# Patient Record
Sex: FEMALE | Race: WHITE | NOT HISPANIC OR LATINO | Employment: FULL TIME | URBAN - METROPOLITAN AREA
[De-identification: names, ages, dates, MRNs, and addresses within clinical notes are randomized per-mention and may not be internally consistent; named-entity substitution may affect disease eponyms.]

---

## 2021-11-10 PROCEDURE — U0004 COV-19 TEST NON-CDC HGH THRU: HCPCS | Performed by: FAMILY MEDICINE

## 2024-04-09 ENCOUNTER — LAB (OUTPATIENT)
Dept: LAB | Facility: HOSPITAL | Age: 34
End: 2024-04-09
Payer: COMMERCIAL

## 2024-04-09 DIAGNOSIS — K52.9 GASTROENTERITIS: ICD-10-CM

## 2024-04-09 LAB

## 2024-04-09 PROCEDURE — 87324 CLOSTRIDIUM AG IA: CPT

## 2024-04-09 PROCEDURE — 87449 NOS EACH ORGANISM AG IA: CPT

## 2024-04-09 PROCEDURE — 87507 IADNA-DNA/RNA PROBE TQ 12-25: CPT

## 2024-04-10 LAB
C DIFF GDH + TOXINS A+B STL QL IA.RAPID: NEGATIVE
C DIFF GDH + TOXINS A+B STL QL IA.RAPID: NEGATIVE

## 2024-04-16 ENCOUNTER — OFFICE VISIT (OUTPATIENT)
Dept: FAMILY MEDICINE CLINIC | Facility: CLINIC | Age: 34
End: 2024-04-16
Payer: COMMERCIAL

## 2024-04-16 VITALS
HEART RATE: 87 BPM | BODY MASS INDEX: 25.08 KG/M2 | DIASTOLIC BLOOD PRESSURE: 74 MMHG | TEMPERATURE: 98 F | SYSTOLIC BLOOD PRESSURE: 110 MMHG | WEIGHT: 159.8 LBS | HEIGHT: 67 IN | OXYGEN SATURATION: 99 %

## 2024-04-16 DIAGNOSIS — R10.84 ABDOMINAL CRAMPING, GENERALIZED: ICD-10-CM

## 2024-04-16 DIAGNOSIS — R10.10 UPPER ABDOMINAL PAIN: Primary | ICD-10-CM

## 2024-04-16 DIAGNOSIS — R19.7 DIARRHEA, UNSPECIFIED TYPE: ICD-10-CM

## 2024-04-16 DIAGNOSIS — R11.0 NAUSEA: ICD-10-CM

## 2024-04-16 PROBLEM — D50.0 IRON DEFICIENCY ANEMIA DUE TO CHRONIC BLOOD LOSS: Status: ACTIVE | Noted: 2023-03-10

## 2024-04-16 PROBLEM — D69.3 CHRONIC ITP (IDIOPATHIC THROMBOCYTOPENIA): Status: ACTIVE | Noted: 2024-04-16

## 2024-04-16 PROBLEM — Z86.2 HISTORY OF ITP: Status: ACTIVE | Noted: 2023-03-13

## 2024-04-16 PROBLEM — D80.3 IGG DEFICIENCY: Status: ACTIVE | Noted: 2023-03-22

## 2024-04-16 PROCEDURE — 99213 OFFICE O/P EST LOW 20 MIN: CPT | Performed by: NURSE PRACTITIONER

## 2024-04-16 RX ORDER — DICYCLOMINE HYDROCHLORIDE 10 MG/1
10 CAPSULE ORAL
Qty: 90 CAPSULE | Refills: 2 | Status: SHIPPED | OUTPATIENT
Start: 2024-04-16

## 2024-04-16 RX ORDER — DICYCLOMINE HYDROCHLORIDE 10 MG/1
CAPSULE ORAL
Qty: 368 CAPSULE | OUTPATIENT
Start: 2024-04-16

## 2024-04-16 NOTE — PROGRESS NOTES
"Subjective   Asha Lee is a 33 y.o. female presents for   Chief Complaint   Patient presents with    GI Problem       Health Maintenance Due   Topic Date Due    BMI FOLLOWUP  Never done    ANNUAL PHYSICAL  Never done    PAP SMEAR  Never done    COVID-19 Vaccine (3 - 2023-24 season) 09/01/2023       History of Present Illness   Patient present to establish care.  She reports a Hx of ITP and states that her platelet count was really low yesterday.  She is returning to hematology to have it rechecked today.  She also reports that she has been experiencing GI symptoms that started on Easter with nausea, cramping and diarrhea after eating.  She states since that time, she cannot eat anything without getting nauseated and having diarrhea and has been waiting all day to eat due to symptoms.  She reports pain across her upper abdomen after eating anything, but states it does not typically occur with drinking.    She reports a 5 lb weight loss in the past month.  She was prescribed zofran at urgent care but her insurance hasn't approved it yet.  She states she also applies a heating pad and it will improve symptoms as well.  She reports a history of IBS in high school and was able to improve symptoms with dietary changes.  She currently takes a probiotic daily.  Discussed with patient multiple causes of pain, but will prescribe Bentyl today due to history of IBS.  Also discussed symptoms of gallbladder disease and will complete a gallbladder ultrasound for further evaluation.  Will also refer patient to GI for ongoing management unless a clear cause is found in this workup.  Vitals:    04/16/24 1032   BP: 110/74   Pulse: 87   Temp: 98 °F (36.7 °C)   TempSrc: Tympanic   SpO2: 99%   Weight: 72.5 kg (159 lb 12.8 oz)   Height: 170 cm (66.93\")     Body mass index is 25.08 kg/m².    Current Outpatient Medications on File Prior to Visit   Medication Sig Dispense Refill    IRON-VITAMIN C PO Take  by mouth. (Patient not taking: " Reported on 4/16/2024)      ondansetron ODT (ZOFRAN-ODT) 4 MG disintegrating tablet Place 1 tablet on the tongue Every 8 (Eight) Hours As Needed for Nausea. (Patient not taking: Reported on 4/16/2024) 20 tablet 0     No current facility-administered medications on file prior to visit.       The following portions of the patient's history were reviewed and updated as appropriate: allergies, current medications, past family history, past medical history, past social history, past surgical history, and problem list.    Review of Systems   Gastrointestinal:  Positive for abdominal pain.       Objective   Physical Exam  Vitals and nursing note reviewed.   Constitutional:       Appearance: Normal appearance. She is well-developed.   HENT:      Head: Normocephalic and atraumatic.      Right Ear: External ear normal.      Left Ear: External ear normal.      Nose: Nose normal.   Eyes:      Extraocular Movements: Extraocular movements intact.      Conjunctiva/sclera: Conjunctivae normal.      Pupils: Pupils are equal, round, and reactive to light.   Cardiovascular:      Rate and Rhythm: Normal rate and regular rhythm.      Heart sounds: Normal heart sounds.   Pulmonary:      Effort: Pulmonary effort is normal.      Breath sounds: Normal breath sounds.   Abdominal:      General: Bowel sounds are normal. There is no distension.      Palpations: Abdomen is soft.      Tenderness: There is abdominal tenderness (Upper abdomen). There is no guarding or rebound.      Hernia: No hernia is present.   Genitourinary:     Vagina: Normal.   Musculoskeletal:         General: Normal range of motion.      Cervical back: Normal range of motion and neck supple.   Skin:     General: Skin is warm and dry.   Neurological:      General: No focal deficit present.      Mental Status: She is alert and oriented to person, place, and time.   Psychiatric:         Mood and Affect: Mood normal.         Behavior: Behavior normal.         Judgment: Judgment  normal.       PHQ-9 Total Score:      Assessment & Plan   Diagnoses and all orders for this visit:    1. Upper abdominal pain (Primary)  -     Ambulatory Referral to Gastroenterology  -     US Gallbladder; Future    2. Nausea  -     Ambulatory Referral to Gastroenterology  -     US Gallbladder; Future    3. Diarrhea, unspecified type  -     Ambulatory Referral to Gastroenterology  -     dicyclomine (BENTYL) 10 MG capsule; Take 1 capsule by mouth 4 (Four) Times a Day Before Meals & at Bedtime As Needed for Abdominal Cramping.  Dispense: 90 capsule; Refill: 2    4. Abdominal cramping, generalized  -     Ambulatory Referral to Gastroenterology  -     dicyclomine (BENTYL) 10 MG capsule; Take 1 capsule by mouth 4 (Four) Times a Day Before Meals & at Bedtime As Needed for Abdominal Cramping.  Dispense: 90 capsule; Refill: 2        There are no Patient Instructions on file for this visit.

## 2024-04-16 NOTE — TELEPHONE ENCOUNTER
Rx Refill Note  Requested Prescriptions     Pending Prescriptions Disp Refills   • dicyclomine (BENTYL) 10 MG capsule [Pharmacy Med Name: DICYCLOMINE 10MG CAPSULES] 368 capsule      Sig: TAKE 1 CAPSULE BY MOUTH FOUR TIMES DAILY BEFORE MEALS AND AT BEDTIME AS NEEDED FOR ABDOMINAL CRAMPS      Last office visit with prescribing clinician: 4/16/2024      Next office visit with prescribing clinician: 10/16/2024   3}  Melva Brian  04/16/24, 12:23 EDT

## 2024-04-29 DIAGNOSIS — R10.10 UPPER ABDOMINAL PAIN: Primary | ICD-10-CM

## 2024-04-29 DIAGNOSIS — R11.0 NAUSEA: ICD-10-CM

## 2024-04-29 DIAGNOSIS — R10.84 ABDOMINAL CRAMPING, GENERALIZED: ICD-10-CM

## 2024-04-29 DIAGNOSIS — R19.7 DIARRHEA, UNSPECIFIED TYPE: ICD-10-CM

## 2024-04-29 DIAGNOSIS — R10.10 UPPER ABDOMINAL PAIN: ICD-10-CM

## 2024-04-29 RX ORDER — OMEPRAZOLE 40 MG/1
40 CAPSULE, DELAYED RELEASE ORAL DAILY
Qty: 90 CAPSULE | Refills: 0 | Status: SHIPPED | OUTPATIENT
Start: 2024-04-29

## 2024-04-29 RX ORDER — OMEPRAZOLE 40 MG/1
40 CAPSULE, DELAYED RELEASE ORAL DAILY
Qty: 30 CAPSULE | Refills: 6 | Status: SHIPPED | OUTPATIENT
Start: 2024-04-29 | End: 2024-04-29

## 2024-05-02 ENCOUNTER — PATIENT ROUNDING (BHMG ONLY) (OUTPATIENT)
Dept: FAMILY MEDICINE CLINIC | Facility: CLINIC | Age: 34
End: 2024-05-02
Payer: COMMERCIAL

## 2024-05-02 NOTE — PROGRESS NOTES
A [employer] message has been sent to the patient for patient rounding with [facility]

## 2024-06-17 ENCOUNTER — OFFICE (OUTPATIENT)
Dept: URBAN - METROPOLITAN AREA CLINIC 64 | Facility: CLINIC | Age: 34
End: 2024-06-17
Payer: COMMERCIAL

## 2024-06-17 VITALS
HEIGHT: 67 IN | HEART RATE: 75 BPM | WEIGHT: 162 LBS | SYSTOLIC BLOOD PRESSURE: 132 MMHG | DIASTOLIC BLOOD PRESSURE: 82 MMHG

## 2024-06-17 DIAGNOSIS — R11.0 NAUSEA: ICD-10-CM

## 2024-06-17 DIAGNOSIS — D69.3 IMMUNE THROMBOCYTOPENIC PURPURA: ICD-10-CM

## 2024-06-17 DIAGNOSIS — D50.9 IRON DEFICIENCY ANEMIA, UNSPECIFIED: ICD-10-CM

## 2024-06-17 DIAGNOSIS — R10.13 EPIGASTRIC PAIN: ICD-10-CM

## 2024-06-17 DIAGNOSIS — R19.8 OTHER SPECIFIED SYMPTOMS AND SIGNS INVOLVING THE DIGESTIVE S: ICD-10-CM

## 2024-06-17 DIAGNOSIS — K58.1 IRRITABLE BOWEL SYNDROME WITH CONSTIPATION: ICD-10-CM

## 2024-06-17 PROCEDURE — 99204 OFFICE O/P NEW MOD 45 MIN: CPT | Performed by: NURSE PRACTITIONER

## 2024-10-16 ENCOUNTER — OFFICE VISIT (OUTPATIENT)
Dept: FAMILY MEDICINE CLINIC | Facility: CLINIC | Age: 34
End: 2024-10-16
Payer: COMMERCIAL

## 2024-10-16 VITALS
HEIGHT: 67 IN | DIASTOLIC BLOOD PRESSURE: 62 MMHG | SYSTOLIC BLOOD PRESSURE: 103 MMHG | BODY MASS INDEX: 25.05 KG/M2 | TEMPERATURE: 97.7 F | WEIGHT: 159.6 LBS | HEART RATE: 59 BPM | OXYGEN SATURATION: 98 %

## 2024-10-16 DIAGNOSIS — Z00.00 ROUTINE GENERAL MEDICAL EXAMINATION AT A HEALTH CARE FACILITY: Primary | ICD-10-CM

## 2024-10-16 DIAGNOSIS — D69.3 CHRONIC ITP (IDIOPATHIC THROMBOCYTOPENIA): ICD-10-CM

## 2024-10-16 PROCEDURE — 99395 PREV VISIT EST AGE 18-39: CPT | Performed by: NURSE PRACTITIONER

## 2024-10-16 NOTE — PROGRESS NOTES
"Subjective   Asha Lee is a 33 y.o. female presents for   Chief Complaint   Patient presents with    Annual Exam     Patient does not currently have a GYN needing a referral  Does not want a FLU/COVID shot  No additional concerns.       Health Maintenance Due   Topic Date Due    ANNUAL PHYSICAL  Never done    PAP SMEAR  Never done       History of Present Illness  The patient is a 33-year-old female who presents for an annual exam.    She reports no current health concerns or issues. Her last visit was due to stomach problems, which she believes were caused by a severe virus that persisted for two weeks. She has not experienced any recurrence of these symptoms since then. A HIDA scan and ultrasound were performed, both of which showed normal results.    She has a history of low platelet count, for which she takes Promacta.  She has been having monthly lab tests due to her Promacta treatment. Initially, she was taking the medication daily, but after a couple of weeks, Dr. Nguyễn advised her to take it every other day.    She maintains a healthy diet and exercise routine, although she has been unable to visit the gym as frequently as she did during the summer due to her work schedule. She exercises two to three times a week and is mindful of her diet, avoiding certain foods. She reports no issues with urination or bowel movements, and is not experiencing any pain or swelling.    She declined the flu and COVID-19 vaccines during this visit.    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    Vitals:    10/16/24 1536   BP: 103/62   BP Location: Right arm   Patient Position: Sitting   Cuff Size: Adult   Pulse: 59   Temp: 97.7 °F (36.5 °C)   TempSrc: Temporal   SpO2: 98%   Weight: 72.4 kg (159 lb 9.6 oz)   Height: 170 cm (66.93\")     Body mass index is 25.05 kg/m².    Current Outpatient Medications on File Prior to Visit   Medication Sig Dispense Refill    eltrombopag " (PROMACTA) 50 MG tablet Take 1 tablet by mouth.      [DISCONTINUED] omeprazole (priLOSEC) 40 MG capsule TAKE 1 CAPSULE BY MOUTH DAILY 90 capsule 0    [DISCONTINUED] dicyclomine (BENTYL) 10 MG capsule Take 1 capsule by mouth 4 (Four) Times a Day Before Meals & at Bedtime As Needed for Abdominal Cramping. (Patient not taking: Reported on 10/16/2024) 90 capsule 2    [DISCONTINUED] ondansetron ODT (ZOFRAN-ODT) 4 MG disintegrating tablet Place 1 tablet on the tongue Every 8 (Eight) Hours As Needed for Nausea. (Patient not taking: Reported on 4/16/2024) 20 tablet 0     No current facility-administered medications on file prior to visit.       The following portions of the patient's history were reviewed and updated as appropriate: allergies, current medications, past family history, past medical history, past social history, past surgical history, and problem list.    Review of Systems    Objective   Physical Exam  Vitals and nursing note reviewed.   Constitutional:       Appearance: Normal appearance. She is well-developed.   HENT:      Head: Normocephalic and atraumatic.      Right Ear: Tympanic membrane, ear canal and external ear normal.      Left Ear: Tympanic membrane, ear canal and external ear normal.      Nose: Nose normal.   Eyes:      Extraocular Movements: Extraocular movements intact.      Conjunctiva/sclera: Conjunctivae normal.      Pupils: Pupils are equal, round, and reactive to light.   Cardiovascular:      Rate and Rhythm: Normal rate and regular rhythm.      Pulses: Normal pulses.      Heart sounds: Normal heart sounds.   Pulmonary:      Effort: Pulmonary effort is normal.      Breath sounds: Normal breath sounds.   Abdominal:      General: Bowel sounds are normal.      Palpations: Abdomen is soft.   Genitourinary:     Vagina: Normal.   Musculoskeletal:         General: Normal range of motion.      Cervical back: Normal range of motion and neck supple.   Skin:     General: Skin is warm and dry.    Neurological:      General: No focal deficit present.      Mental Status: She is alert and oriented to person, place, and time.   Psychiatric:         Mood and Affect: Mood normal.         Behavior: Behavior normal.         Judgment: Judgment normal.              PHQ-9 Total Score:      Results  Imaging  HIDA scan and ultrasound of gallbladder were normal.    Assessment & Plan   Diagnoses and all orders for this visit:    1. Routine general medical examination at a health care facility (Primary)  Comments:  Patient counseled on importance of balanced diet and routine exercise as well as risk and benefits of current recommended vaccines.  Orders:  -     Lipid Panel; Future  -     TSH; Future    2. Chronic ITP (idiopathic thrombocytopenia)         1. Annual exam.  She is not yet due for mammogram or colon cancer screening unless there is a significant family history of these conditions. Orders for thyroid and cholesterol tests will be provided. She is advised to continue with annual check-ups unless specific concerns arise.    2. Thrombocytopenia.  She has a history of low platelets and is currently on Promacta. She reports monthly lab work to monitor her platelet levels. She will continue with monthly CBC tests at [facility] to track her platelet levels.      There are no Patient Instructions on file for this visit.         Patient or patient representative verbalized consent for the use of Ambient Listening during the visit with  SUMIT Schrader for chart documentation. 10/16/2024  15:50 EDT

## 2025-01-08 ENCOUNTER — OFFICE VISIT (OUTPATIENT)
Dept: FAMILY MEDICINE CLINIC | Facility: CLINIC | Age: 35
End: 2025-01-08
Payer: COMMERCIAL

## 2025-01-08 VITALS
HEIGHT: 67 IN | TEMPERATURE: 99.3 F | WEIGHT: 160.8 LBS | SYSTOLIC BLOOD PRESSURE: 116 MMHG | HEART RATE: 91 BPM | BODY MASS INDEX: 25.24 KG/M2 | OXYGEN SATURATION: 96 % | DIASTOLIC BLOOD PRESSURE: 72 MMHG

## 2025-01-08 DIAGNOSIS — K52.9 COLITIS: Primary | ICD-10-CM

## 2025-01-08 PROCEDURE — 99213 OFFICE O/P EST LOW 20 MIN: CPT | Performed by: NURSE PRACTITIONER

## 2025-01-08 RX ORDER — ONDANSETRON 4 MG/1
4 TABLET, ORALLY DISINTEGRATING ORAL EVERY 8 HOURS PRN
COMMUNITY
Start: 2025-01-07 | End: 2025-01-12

## 2025-01-08 RX ORDER — CIPROFLOXACIN HYDROCHLORIDE 500 MG/1
500 TABLET, FILM COATED ORAL 2 TIMES DAILY
COMMUNITY
Start: 2025-01-07 | End: 2025-01-17

## 2025-01-08 RX ORDER — METRONIDAZOLE 500 MG/1
500 TABLET ORAL 2 TIMES DAILY
COMMUNITY
Start: 2025-01-07 | End: 2025-01-17

## 2025-01-08 RX ORDER — OMEPRAZOLE 40 MG/1
40 CAPSULE, DELAYED RELEASE ORAL DAILY
Qty: 90 CAPSULE | Refills: 1 | Status: SHIPPED | OUTPATIENT
Start: 2025-01-08

## 2025-01-08 RX ORDER — OXYCODONE HYDROCHLORIDE AND ACETAMINOPHEN 5; 325 MG/1; MG/1
1 TABLET ORAL EVERY 4 HOURS PRN
COMMUNITY
Start: 2025-01-07 | End: 2025-01-12

## 2025-01-08 RX ORDER — OMEPRAZOLE 40 MG/1
1 CAPSULE, DELAYED RELEASE ORAL DAILY
COMMUNITY
Start: 2024-12-03 | End: 2025-01-08 | Stop reason: SDUPTHER

## 2025-01-08 NOTE — PROGRESS NOTES
Subjective   Asha Lee is a 34 y.o. female presents for   Chief Complaint   Patient presents with    Hospital Follow Up Visit     Dx: Colitis. Patient has some questions and will need refills on meds       Health Maintenance Due   Topic Date Due    PAP SMEAR  Never done       History of Present Illness  The patient is a 34-year-old female who presents for follow up of colitis.    She sought emergency care yesterday due to an episode of colitis, characterized by persistent bloody diarrhea. She was prescribed antibiotics, which she started taking around 4:30 PM yesterday. She reports a decrease in pain intensity, describing it as throbbing and manageable compared to the previous day. She has a history of gastrointestinal issues dating back to high school, which have led her to adopt a cautious approach to her diet. She has not consumed any food since last Thursday, when she began experiencing intermittent pain. The pain intensified the night before her ER visit, disrupting her sleep. She avoids soft drinks and certain foods that exacerbate her symptoms, such as raw broccoli. She has previously consulted with a gastroenterologist for her gut issues and underwent a HIDA scan, which yielded normal results. She was prescribed dicyclomine, which alleviated her symptoms. She has never undergone a colonoscopy and does not experience constipation. She is uncertain about the need for medication refills, as she was advised in the ER to continue her current regimen until completion. She has sufficient Zofran at home, having only taken one dose last night, and none today. She was also prescribed Percocet for pain management, of which she took one dose last night, another during the night, and a third this morning. She has not required additional doses since then.    Supplemental Information  Her platelet count was recorded as 84 during her ER visit. She is currently on a regimen of Promacta, administered every other  "day.    MEDICATIONS  Current: Promacta, Cipro, Flagyl, omeprazole, Zofran, Percocet    Vitals:    01/08/25 1401   BP: 116/72   BP Location: Right arm   Patient Position: Sitting   Cuff Size: Large Adult   Pulse: 91   Temp: 99.3 °F (37.4 °C)   TempSrc: Tympanic   SpO2: 96%   Weight: 72.9 kg (160 lb 12.8 oz)   Height: 170 cm (66.93\")     Body mass index is 25.24 kg/m².    Current Outpatient Medications on File Prior to Visit   Medication Sig Dispense Refill    Cipro 500 MG tablet Take 1 tablet by mouth 2 (Two) Times a Day.      eltrombopag (PROMACTA) 50 MG tablet Take 1 tablet by mouth.      metroNIDAZOLE (FLAGYL) 500 MG tablet Take 1 tablet by mouth 2 (Two) Times a Day.      ondansetron ODT (ZOFRAN-ODT) 4 MG disintegrating tablet Place 1 tablet on the tongue Every 8 (Eight) Hours As Needed for Nausea.      Percocet 5-325 MG per tablet Take 1 tablet by mouth Every 4 (Four) Hours As Needed for Moderate Pain.      [DISCONTINUED] omeprazole (priLOSEC) 40 MG capsule Take 1 capsule by mouth Daily.       No current facility-administered medications on file prior to visit.       The following portions of the patient's history were reviewed and updated as appropriate: allergies, current medications, past family history, past medical history, past social history, past surgical history, and problem list.    Review of Systems   Gastrointestinal:  Positive for abdominal pain, blood in stool and diarrhea.       Objective   Physical Exam  Vitals and nursing note reviewed.   Constitutional:       Appearance: Normal appearance. She is well-developed.   HENT:      Head: Normocephalic and atraumatic.      Right Ear: External ear normal.      Left Ear: External ear normal.      Nose: Nose normal.   Eyes:      Extraocular Movements: Extraocular movements intact.      Pupils: Pupils are equal, round, and reactive to light.   Cardiovascular:      Rate and Rhythm: Normal rate and regular rhythm.      Heart sounds: Normal heart sounds. "   Pulmonary:      Effort: Pulmonary effort is normal.      Breath sounds: Normal breath sounds.   Abdominal:      General: Bowel sounds are normal.      Palpations: Abdomen is soft.      Tenderness: There is abdominal tenderness (RLQ).   Genitourinary:     Vagina: Normal.   Musculoskeletal:         General: Normal range of motion.      Cervical back: Normal range of motion and neck supple.   Skin:     General: Skin is warm and dry.   Neurological:      General: No focal deficit present.      Mental Status: She is alert and oriented to person, place, and time.   Psychiatric:         Mood and Affect: Mood normal.         Behavior: Behavior normal.         Judgment: Judgment normal.          Abdominal exam was performed.    PHQ-9 Total Score:      Results  Laboratory Studies  Platelets were 84.    Assessment & Plan   There are no diagnoses linked to this encounter.     1. Colitis.  She was recently treated in the ER for colitis and prescribed Cipro and Flagyl. She reports ongoing bloody diarrhea and abdominal pain, which has slightly improved but persists. She is still early in her course of antibiotics, probably not going to notice significant difference until tomorrow or the next day and by Friday she will start feeling quite a bit better. She is advised to adhere to a clear liquid diet initially, gradually transitioning to bland foods such as broth-type soups and saltine crackers. Given her history, she may be more susceptible to inflammation and bleeding. She is encouraged to follow up with a gastroenterologist, particularly if the condition becomes recurrent. The potential risks associated with untreated colitis were discussed. She is reassured that the antibiotics are a temporary measure to reduce inflammation and infection, and retreatment will be considered if symptoms recur.  She is instructed to complete the antibiotic course as prescribed and to inform us if her condition worsens or fails to improve upon  completion of the antibiotics. A prescription for omeprazole 20 mg has been provided.    Follow-up  The patient will follow up as needed.    There are no Patient Instructions on file for this visit.         Patient or patient representative verbalized consent for the use of Ambient Listening during the visit with  SUMIT Schrader for chart documentation. 1/8/2025  14:39 EST

## 2025-01-14 ENCOUNTER — OFFICE (OUTPATIENT)
Dept: URBAN - METROPOLITAN AREA CLINIC 64 | Facility: CLINIC | Age: 35
End: 2025-01-14
Payer: COMMERCIAL

## 2025-01-14 VITALS
SYSTOLIC BLOOD PRESSURE: 105 MMHG | HEIGHT: 67 IN | WEIGHT: 160 LBS | HEART RATE: 67 BPM | DIASTOLIC BLOOD PRESSURE: 71 MMHG

## 2025-01-14 DIAGNOSIS — R11.0 NAUSEA: ICD-10-CM

## 2025-01-14 DIAGNOSIS — R19.8 OTHER SPECIFIED SYMPTOMS AND SIGNS INVOLVING THE DIGESTIVE S: ICD-10-CM

## 2025-01-14 DIAGNOSIS — R10.84 GENERALIZED ABDOMINAL PAIN: ICD-10-CM

## 2025-01-14 DIAGNOSIS — K52.9 NONINFECTIVE GASTROENTERITIS AND COLITIS, UNSPECIFIED: ICD-10-CM

## 2025-01-14 DIAGNOSIS — D50.9 IRON DEFICIENCY ANEMIA, UNSPECIFIED: ICD-10-CM

## 2025-01-14 PROCEDURE — 99213 OFFICE O/P EST LOW 20 MIN: CPT | Performed by: NURSE PRACTITIONER

## 2025-02-07 ENCOUNTER — TELEPHONE (OUTPATIENT)
Dept: FAMILY MEDICINE CLINIC | Facility: CLINIC | Age: 35
End: 2025-02-07
Payer: COMMERCIAL

## 2025-02-07 RX ORDER — METRONIDAZOLE 500 MG/1
500 TABLET ORAL 2 TIMES DAILY
Qty: 20 TABLET | Refills: 0 | Status: SHIPPED | OUTPATIENT
Start: 2025-02-07 | End: 2025-02-17

## 2025-02-07 NOTE — TELEPHONE ENCOUNTER
Asha Lee notified and voiced comprehension and understanding.    She stated that she is having pain and cramping in her left lower abdomin. She is not needing pain medicine just something to help her until her colonoscopy on 2/19/25.

## 2025-02-07 NOTE — TELEPHONE ENCOUNTER
Caller: Asha Lee    Relationship to patient: Self    Best call back number:   Telephone Information:   Mobile [phone]         Patient is needing: PATIENT IS CALLING TO REQUEST MEDICATION THAT SHE RECEIVED AT [facility] ER BE CALLED IN FOR HER. SHE SAID AT HER FOLLOW UP VISIT WITH PCP SHE TOLD HER IF SHE HAD COMPLICATIONS AGAIN TO CALL OFFICE AND SHE WOULD CALL IN. PATIENT IS AT WORK AND CANNOT THINK OF THE NAME OF MEDICATION. PLEASE CALL PATIENT BACK.

## 2025-02-07 NOTE — TELEPHONE ENCOUNTER
Review of the last note shows that she was recently treated for colitis.  Ask what symptoms she is experiencing as it looks like a few different medications were sent in for her.

## 2025-02-19 ENCOUNTER — ON CAMPUS - OUTPATIENT (AMBULATORY)
Dept: URBAN - METROPOLITAN AREA HOSPITAL 2 | Facility: HOSPITAL | Age: 35
End: 2025-02-19
Payer: COMMERCIAL

## 2025-02-19 VITALS
DIASTOLIC BLOOD PRESSURE: 58 MMHG | SYSTOLIC BLOOD PRESSURE: 122 MMHG | SYSTOLIC BLOOD PRESSURE: 104 MMHG | OXYGEN SATURATION: 98 % | HEART RATE: 82 BPM | SYSTOLIC BLOOD PRESSURE: 97 MMHG | WEIGHT: 159 LBS | HEART RATE: 77 BPM | DIASTOLIC BLOOD PRESSURE: 81 MMHG | DIASTOLIC BLOOD PRESSURE: 65 MMHG | HEIGHT: 67 IN | RESPIRATION RATE: 18 BRPM | SYSTOLIC BLOOD PRESSURE: 106 MMHG | HEART RATE: 68 BPM | HEART RATE: 81 BPM | HEART RATE: 67 BPM | OXYGEN SATURATION: 100 % | HEART RATE: 79 BPM | RESPIRATION RATE: 15 BRPM | DIASTOLIC BLOOD PRESSURE: 69 MMHG | HEART RATE: 73 BPM | OXYGEN SATURATION: 99 % | DIASTOLIC BLOOD PRESSURE: 83 MMHG | HEART RATE: 89 BPM | HEART RATE: 70 BPM | RESPIRATION RATE: 14 BRPM | SYSTOLIC BLOOD PRESSURE: 140 MMHG | SYSTOLIC BLOOD PRESSURE: 141 MMHG | HEART RATE: 75 BPM | SYSTOLIC BLOOD PRESSURE: 95 MMHG | TEMPERATURE: 98.3 F | SYSTOLIC BLOOD PRESSURE: 99 MMHG | DIASTOLIC BLOOD PRESSURE: 68 MMHG | RESPIRATION RATE: 16 BRPM | DIASTOLIC BLOOD PRESSURE: 63 MMHG | HEART RATE: 71 BPM

## 2025-02-19 DIAGNOSIS — D50.9 IRON DEFICIENCY ANEMIA, UNSPECIFIED: ICD-10-CM

## 2025-02-19 DIAGNOSIS — R11.0 NAUSEA: ICD-10-CM

## 2025-02-19 DIAGNOSIS — K52.9 NONINFECTIVE GASTROENTERITIS AND COLITIS, UNSPECIFIED: ICD-10-CM

## 2025-02-19 PROCEDURE — 45378 DIAGNOSTIC COLONOSCOPY: CPT | Performed by: INTERNAL MEDICINE

## 2025-02-19 PROCEDURE — 43235 EGD DIAGNOSTIC BRUSH WASH: CPT | Performed by: INTERNAL MEDICINE
